# Patient Record
Sex: FEMALE | Race: WHITE | URBAN - METROPOLITAN AREA
[De-identification: names, ages, dates, MRNs, and addresses within clinical notes are randomized per-mention and may not be internally consistent; named-entity substitution may affect disease eponyms.]

---

## 2017-01-17 NOTE — PATIENT DISCUSSION
Primary Open Angle Glaucoma Counseling: I have reviewed the regimen of glaucoma drops with the patient and have stressed the importance of compliance. Patient instructed to continue present medication and return for follow-up as scheduled.

## 2017-01-17 NOTE — PATIENT DISCUSSION
POAG, OU:  INTRAOCULAR PRESSURE IS BORDERLINE. PATIENT INSTRUCTED WILL CONTINUE TO MONITOR IOP WITHOUT DROPS AT THIS TIME. RETURN FOR FOLLOW-UP AS SCHEDULED.

## 2018-02-13 NOTE — PATIENT DISCUSSION
POAG, OU: INTRAOCULAR PRESSURE IS TOO ELEVATED PATIENT INSTRUCTED TO START A DROP LATANOPROST QHS OU OR SLT OU. PATIENT DESIRES SLT OU. RETURN FOR FOLLOW-UP AS SCHEDULED.

## 2018-02-13 NOTE — PATIENT DISCUSSION
Primary Open Angle Glaucoma Counseling:  I have explained the diagnosis of  glaucoma and discussed the importance of lowering intraocular pressure to prevent further optic nerve damage and possible vision loss. I have discussed the various treatment options including medications, SLT laser and surgery. I emphasized to the patient the importance of compliance with treatment and follow-up appointments. Discussed R/B/A of SLT with patient including risk of increased iop, corneal abrasion, eye pain, loss of vision, or loss of eye. Patient understands and desires to schedule SLT.

## 2018-03-13 NOTE — PATIENT DISCUSSION
New Prescription: latanoprost (latanoprost): drops: 0.005% 1 drop at bedtime as directed into both eyes 03-

## 2018-03-13 NOTE — PATIENT DISCUSSION
POAG, OU:  IOP ELEVATED OU. Ja Galvin PATIENT INSTRUCTED TO __START LATANOPROST QHS OU__________________________. RETURN FOR FOLLOW-UP AS SCHEDULED.

## 2018-04-17 NOTE — PATIENT DISCUSSION
POAG, OU:  INTRAOCULAR PRESSURE IS STILL ELEVATED. PATIENT INSTRUCTED TO CONTINUE _LATANOPROST QHS OU______ AND RETURN FOR FOLLOW-UP AS SCHEDULED.

## 2018-05-29 NOTE — PATIENT DISCUSSION
POAG, OU:  INTRAOCULAR PRESSURE IS WITHIN ACCEPTABLE LIMITS. PATIENT INSTRUCTED TO CONTINUE ___LATANAPROST QHS OU_____ AND RETURN FOR FOLLOW-UP AS SCHEDULED.

## 2018-08-28 NOTE — PATIENT DISCUSSION
New Prescription: timolol maleate (timolol maleate): drops: 0.25% 1 drop twice a day into both eyes 08-

## 2018-08-28 NOTE — PATIENT DISCUSSION
POAG, OU:  INTRAOCULAR PRESSURE IS ELEVATED OU PATIENT INSTRUCTED TO CONTINUE LATANOPROST QHS OU AND START TIMOLOL QAM OU________ AND RETURN FOR FOLLOW-UP AS SCHEDULED.

## 2018-10-09 NOTE — PATIENT DISCUSSION
New Prescription: dorzolamide-timolol (dorzolamide-timolol): drops: 2-0.5% 1 drop twice a day into both eyes 10-

## 2018-10-09 NOTE — PATIENT DISCUSSION
POAG, OU:  INTRAOCULAR PRESSURE IS WITHIN ACCEPTABLE LIMITS. PATIENT INSTRUCTED TO CONTINUE _latanoprost qhs ou stop timolol and start cosopt bid ou_______ AND RETURN FOR FOLLOW-UP AS SCHEDULED.

## 2018-11-20 NOTE — PATIENT DISCUSSION
Continue: dorzolamide-timolol (dorzolamide-timolol): drops: 2-0.5% 1 drop twice a day into both eyes 10-

## 2018-11-26 NOTE — PATIENT DISCUSSION
POAG OU:  PATIENT USING LATANOPROST OU QHS AND TIMOLOL OU BID, VISUAL FIELD SHOWS SIGNIFICANT PROGRESSION IN BOTH EYES. PRESSURES VERY HIGH TODAY. WILL ADD SIMBINZIA OU BID OR DORZOLAMIDE OU BID AND ALPHAGAN OU BID. WILL ALSO DO KDB OU.

## 2018-11-26 NOTE — PATIENT DISCUSSION
New Prescription: dorzolamide-timolol (dorzolamide-timolol): drops: 2-0.5% 1 drop twice a day into both eyes 11-

## 2018-12-11 NOTE — PATIENT DISCUSSION
New Prescription: prednisolone acetate (prednisolone acetate): drops,suspension: 1% 1 drop four times a day into right eye 12-

## 2018-12-11 NOTE — PATIENT DISCUSSION
PATIENT STOPPED USING ALL DROPS IN THE RIGHT EYE AFTER THE KDB. EXPLAINED IMPORTANCE OF STARTING THE DROPS AGAIN IN THE RIGHT EYE AND WILL START OCUFLOX, ACULAR, AND PREDNISOLONE FOR ONE MONTH AFTER KDB.  WAIT 5 MINS BETWEEN THE DROPS

## 2018-12-20 NOTE — PATIENT DISCUSSION
Continue: dorzolamide-timolol (dorzolamide-timolol): drops: 2-0.5% 1 drop twice a day into both eyes 11-

## 2018-12-20 NOTE — PATIENT DISCUSSION
Continue: prednisolone acetate (prednisolone acetate): drops,suspension: 1% 1 drop four times a day into right eye 12-

## 2019-03-21 NOTE — PATIENT DISCUSSION
COAG OU - IOP WAY TOO HIGH TODAY. PATIENT WAS CONFUSED AND HAS BEEN USING PRED FORTE QID OD. HAS ALSO BEEN USING COSOPT OU OD, AND ALPHAGAN OS. 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 ALSO INSTRUCTED PATEINT TO PUT COSOPT AND ALPHAGAN IN BOTH EYES.

## 2019-06-07 NOTE — PATIENT DISCUSSION
POAG, OU:  INTRAOCULAR PRESSURE IS ELEVATED OD, BORDERLINE OS PATIENT INSTRUCTED TO CONTINUE __ALL 3 DROPS______ AND RETURN FOR FOLLOW-UP AS SCHEDULED. PATIENT IS QUESTIONABLY COMPLIANT. 5

## 2019-06-13 NOTE — PATIENT DISCUSSION
COAG OU - IOP WAY TOO HIGH TODAY. PATIENT HAS BEEN CONFUSED ON DROPS. CONTINUE USING COSOPT OU BID, AND ALPHAGAN BID OU.

## 2019-10-11 NOTE — PATIENT DISCUSSION
ADVANCED COAG OU OD&gt;OS: IOP TOO HIGH OD. PATIENT CONFUSED WITH DROPS AGAIN. PATIENT DID NOT BRING DROPS TO APPT, BUT STATES THAT HE IS ONLY USING 2 DROPS. DROPS INSTRUCTION SHEET GIVEN TO PT AND REFILLS CALLED IN.

## 2020-02-27 NOTE — PATIENT DISCUSSION
POAG OU:  IOP VERY HIGH IN OD TODAY. PER PATIENT HE IS USING THE BRIMONIDINE OU BID AND COSOPT OU BID. HE STATES PHARMACY NEVER GAVE HIM THE LATANOPROST. HAS NOT USED THE LATANOPROST IN A LONG TIME. PER PATIENT HE IS VERY COMPLIANT WITH THE BRIMONIDINE AND COSOPT. GAVE SAMPLE OF LUMIGAN. LATANOPROST AT HIS PHARMACY FOR . WILL PRESCRIBE DIAMOX 250MG BID AND  WILL SCHEDULE YAG PI OD FOR MONDAY.

## 2020-03-03 NOTE — PATIENT DISCUSSION
ANGLE CLOSURE POAG OD:  S/P YAG PI, PRESSURE STILL HIGH TODAY BUT BETTER THAN BEFORE YAG PI, CONTINUE BRIMONIDINE OU BID, COSOPT OU BID, LATANOPORST OU QHS, AND WILL ADD IN RHOPRESSA OD QHS AND CONTINUE DIAMOX UNTIL HE SEES DR. Rajan Estrella

## 2020-03-12 NOTE — PATIENT DISCUSSION
AHMED VALVE RIGHT EYE: Patient instructed on glaucoma diagnosis and progression . IOP in OD is not at target. I have reviewed the findings with patient and has asked the appropriate questions. I recommend  Ahmed Valve with Mitomycin-C  on right eye due to elevated pressure. Informed patient that there is a risk of blurred central vision and diplopia  resulting from this surgery. Proceeding with surgery will help lower eye pressure and salvage any remaining vision in  right  eye. Risks, benefits and alternatives were reviewed with patient. Informed consents were explained to patient and signed. A copy was provided along with pre-operative instructions for surgery . Patient to use Ofloxacin drops qid OD starting 3 days prior to surgery. Ahmed Valve with Mitomycin -C OD is scheduled for March 18th 2020 [facility].

## 2020-03-19 NOTE — PATIENT DISCUSSION
Continue: Pred Forte (prednisolone acetate): drops,suspension: 1% 1 drop four times a day as directed into right eye 03-

## 2020-03-19 NOTE — PATIENT DISCUSSION
POST-OP INSTRUCTIONS RIGHT EYE: Reviewed instructions for post op care with patient. Pressure is low, but usually stabilizes at around 5 to 6 days. Instructed  no exercise and keep head higher than heart until eye stabilizes. Instructed on how to use drops . Instructed to call if pain, redness, or decreased vision occurs. Do not use any glaucoma drops in OD, but continue drops in OS. Use pred-forte QID OD will slowly taper for the next 4 months. Ofloxacin QID OD for 10 days. A copy of Post op instructions were reviewed with patient and a copy was given to patient today.  Patient will return for follow up appointment in 2 weeks PO AMANDA

## 2020-03-19 NOTE — PATIENT DISCUSSION
Continue: dorzolamide-timolol (dorzolamide-timolol): drops: 2-0.5% 1 drop twice a day into left eye 11-

## 2020-03-19 NOTE — PATIENT DISCUSSION
GLAUCOMA: IOP in the left eye elevated today. I have talked with the patient about my impressions, explained our treatment plan, and have answered all questions and patient understands. Patient to continue present medications.

## 2020-04-02 NOTE — PATIENT DISCUSSION
COUNSELING:  I have talked with the patient about my impressions, explained our treatment plan, and have answered all questions. I will see him in 3 months.

## 2020-06-04 ENCOUNTER — IMPORTED ENCOUNTER (OUTPATIENT)
Dept: URBAN - METROPOLITAN AREA CLINIC 50 | Facility: CLINIC | Age: 60
End: 2020-06-04

## 2020-06-05 ENCOUNTER — IMPORTED ENCOUNTER (OUTPATIENT)
Dept: URBAN - METROPOLITAN AREA CLINIC 50 | Facility: CLINIC | Age: 60
End: 2020-06-05

## 2020-06-23 ENCOUNTER — IMPORTED ENCOUNTER (OUTPATIENT)
Dept: URBAN - METROPOLITAN AREA CLINIC 50 | Facility: CLINIC | Age: 60
End: 2020-06-23

## 2020-07-02 NOTE — PATIENT DISCUSSION
GLAUCOMA:  I have talked with the patient about my impressions, explained our treatment plan, and have answered all questions and patient understands. Continue present eye drops. Patient will continue with Pred Forte Once a day for a month and then stop. Patient to follow up 6 weeks for tech check IOP. Dr Flo Siegel will see patient in August for OCT and Dil OU.

## 2020-07-13 ENCOUNTER — IMPORTED ENCOUNTER (OUTPATIENT)
Dept: URBAN - METROPOLITAN AREA CLINIC 50 | Facility: CLINIC | Age: 60
End: 2020-07-13

## 2020-09-03 NOTE — PATIENT DISCUSSION
GLAUCOMA:  I have talked with the patient about my impressions, explained our treatment plan, and have answered all questions and patient understands. Continue present eye drops. Patient to follow up in January. No testing needed.

## 2021-01-21 NOTE — PATIENT DISCUSSION
GLAUCOMA:  I have talked with the patient about my impressions, explained our treatment plan, and have answered all questions and patient understands. Continue present eye drops. Implemented All Universal Safety Interventions:  [place] to call system. Call bell, personal items and telephone within reach. Instruct patient to call for assistance. Room bathroom lighting operational. Non-slip footwear when patient is off stretcher. Physically safe environment: no spills, clutter or unnecessary equipment. Stretcher in lowest position, wheels locked, appropriate side rails in place.

## 2021-04-17 ASSESSMENT — TONOMETRY
OS_IOP_MMHG: 16
OD_IOP_MMHG: 14

## 2021-04-17 ASSESSMENT — VISUAL ACUITY
OS_CC: J1
OD_CC: J1
OD_CC: J1+
OS_CC: J1+
OS_CC: 20/20
OS_BAT: 20/40
OS_OTHER: 20/40. 20/60.
OD_CC: 20/40-1
OD_CC: 20/25
OD_BAT: 20/30
OD_OTHER: 20/30. 20/60.
OS_CC: 20/25+1

## 2021-07-29 NOTE — PATIENT DISCUSSION
Stopped Today: Pred Forte (prednisolone acetate): drops,suspension: 1% 1 drop four times a day as directed into right eye 03-

## 2021-07-29 NOTE — PATIENT DISCUSSION
GLAUCOMA:  I have talked with the patient about my impressions, explained our treatment plan, and have answered all questions and patient understands. Continue present eye drops. Added Rhopressa OS. D/C PredForte.  DMV Reference [facility]:[id number]

## 2021-12-09 ENCOUNTER — PREPPED CHART (OUTPATIENT)
Dept: URBAN - METROPOLITAN AREA CLINIC 53 | Facility: CLINIC | Age: 61
End: 2021-12-09

## 2021-12-14 ENCOUNTER — COMPREHENSIVE EXAM (OUTPATIENT)
Dept: URBAN - METROPOLITAN AREA CLINIC 53 | Facility: CLINIC | Age: 61
End: 2021-12-14

## 2021-12-14 DIAGNOSIS — H25.813: ICD-10-CM

## 2021-12-14 DIAGNOSIS — H52.4: ICD-10-CM

## 2021-12-14 PROCEDURE — 92015 DETERMINE REFRACTIVE STATE: CPT

## 2021-12-14 PROCEDURE — 92310-0E ESTABLISHED CL PATIENT NO ADJUSTMENT

## 2021-12-14 PROCEDURE — 92014 COMPRE OPH EXAM EST PT 1/>: CPT

## 2021-12-14 ASSESSMENT — VISUAL ACUITY
OD_GLARE: 20/30
OU_CC: J1+
OD_CC: 20/20-2
OS_GLARE: 20/40
OS_GLARE: 20/25
OS_CC: 20/25
OD_GLARE: 20/25

## 2021-12-14 ASSESSMENT — TONOMETRY
OS_IOP_MMHG: 12
OD_IOP_MMHG: 12

## 2023-01-04 ENCOUNTER — COMPREHENSIVE EXAM (OUTPATIENT)
Dept: URBAN - METROPOLITAN AREA CLINIC 52 | Facility: CLINIC | Age: 63
End: 2023-01-04

## 2023-01-04 DIAGNOSIS — H25.813: ICD-10-CM

## 2023-01-04 DIAGNOSIS — H52.4: ICD-10-CM

## 2023-01-04 PROCEDURE — 92015 DETERMINE REFRACTIVE STATE: CPT

## 2023-01-04 PROCEDURE — 92310-3E ESTABLISHED CL PATIENT MULTIFOCAL AND/OR MONOVISION SOFT LENS EVALUATION

## 2023-01-04 PROCEDURE — 92014 COMPRE OPH EXAM EST PT 1/>: CPT

## 2023-01-04 ASSESSMENT — VISUAL ACUITY
OS_GLARE: 20/30
OD_GLARE: 20/30
OU_CC: J1@14IN
OD_GLARE: 20/25
OD_CC: 20/20-1
OS_PH: 20/25
OS_CC: 20/30-1
OU_CC: 20/20-1
OS_GLARE: 20/40

## 2023-01-04 ASSESSMENT — TONOMETRY
OS_IOP_MMHG: 13
OD_IOP_MMHG: 12

## 2023-11-30 NOTE — PATIENT DISCUSSION
Continue: latanoprost (latanoprost): drops: 0.005% 1 drop at bedtime into left eye 10- DISPLAY PLAN FREE TEXT

## 2024-08-05 NOTE — PATIENT DISCUSSION
Continue: Linnette RUIZ (brimonidine): drops: 0.15% 1 drop twice a day into both eyes 11- Detail Level: Detailed Detail Level: Generalized Detail Level: Zone

## 2025-05-06 NOTE — PATIENT DISCUSSION
Primary Open Angle Glaucoma Counseling: I have reviewed the regimen of glaucoma drops with the patient and have stressed the importance of compliance. Patient instructed to continue present medication and return for follow-up as scheduled. No